# Patient Record
Sex: FEMALE | URBAN - METROPOLITAN AREA
[De-identification: names, ages, dates, MRNs, and addresses within clinical notes are randomized per-mention and may not be internally consistent; named-entity substitution may affect disease eponyms.]

---

## 2022-01-13 ENCOUNTER — OFFICE VISIT (OUTPATIENT)
Dept: URBAN - METROPOLITAN AREA TELEHEALTH 2 | Facility: TELEHEALTH | Age: 57
End: 2022-01-13
Payer: COMMERCIAL

## 2022-01-13 ENCOUNTER — DASHBOARD ENCOUNTERS (OUTPATIENT)
Age: 57
End: 2022-01-13

## 2022-01-13 ENCOUNTER — TELEPHONE ENCOUNTER (OUTPATIENT)
Dept: URBAN - METROPOLITAN AREA CLINIC 92 | Facility: CLINIC | Age: 57
End: 2022-01-13

## 2022-01-13 ENCOUNTER — TELEPHONE ENCOUNTER (OUTPATIENT)
Dept: URBAN - METROPOLITAN AREA CLINIC 82 | Facility: CLINIC | Age: 57
End: 2022-01-13

## 2022-01-13 DIAGNOSIS — R19.5 POSITIVE COLORECTAL CANCER SCREENING USING COLOGUARD TEST: ICD-10-CM

## 2022-01-13 PROBLEM — 398032003: Status: ACTIVE | Noted: 2022-01-13

## 2022-01-13 PROCEDURE — 99243 OFF/OP CNSLTJ NEW/EST LOW 30: CPT | Performed by: INTERNAL MEDICINE

## 2022-01-13 PROCEDURE — 99203 OFFICE O/P NEW LOW 30 MIN: CPT | Performed by: INTERNAL MEDICINE

## 2022-01-13 RX ORDER — POLYETHYLENE GLYCOL 3350 17 G/17G
AS DIRECTED PRIOR TO COLONOSCOPY POWDER, FOR SOLUTION ORAL ONCE
Qty: 238 GRAM | Refills: 0 | OUTPATIENT
Start: 2022-01-13 | End: 2022-01-14

## 2022-01-13 RX ORDER — METOCLOPRAMIDE HYDROCHLORIDE 10 MG/1
1 TABLET BEFORE MEALS TABLET ORAL TWICE A DAY
Qty: 14 TABLET | Refills: 0 | OUTPATIENT
Start: 2022-01-13

## 2022-01-13 NOTE — HPI-TODAY'S VISIT:
56-year-old female was referred to our office by Dr. Ismael Valdovinos for evaluation of Cologuard test.  Copy of this note will be sent to Dr. Valdovinos office.  Patient has  past medical history significant for history of diabetes on multiple medications was seen today for the evaluation of positive Cologuard test.  Patient stated she has had some loose stools recently when she was on higher dose of metformin however since her hemoglobin A1c was higher recently that she was started on Jardiance and glipizide glimepiride which has helped her blood sugar levels and the loose stools have improved.  Patient denies any chest pain shortness of breath.  Patient denies any symptoms of unintentional weight loss.  She reports occasional loose stools in the mornings and otherwise they improve with time.  Denies any blood in the stool.  She has not had a prior colonoscopy evaluation.  She reports having history of mitral valve prolapse and occasional palpitations.

## 2022-01-14 ENCOUNTER — TELEPHONE ENCOUNTER (OUTPATIENT)
Dept: URBAN - METROPOLITAN AREA CLINIC 82 | Facility: CLINIC | Age: 57
End: 2022-01-14

## 2022-01-18 ENCOUNTER — LAB OUTSIDE AN ENCOUNTER (OUTPATIENT)
Dept: URBAN - METROPOLITAN AREA TELEHEALTH 2 | Facility: TELEHEALTH | Age: 57
End: 2022-01-18

## 2022-01-19 ENCOUNTER — WEB ENCOUNTER (OUTPATIENT)
Dept: URBAN - METROPOLITAN AREA CLINIC 82 | Facility: CLINIC | Age: 57
End: 2022-01-19

## 2022-02-15 ENCOUNTER — OFFICE VISIT (OUTPATIENT)
Dept: URBAN - METROPOLITAN AREA SURGERY CENTER 13 | Facility: SURGERY CENTER | Age: 57
End: 2022-02-15
Payer: COMMERCIAL

## 2022-02-15 ENCOUNTER — CLAIMS CREATED FROM THE CLAIM WINDOW (OUTPATIENT)
Dept: URBAN - METROPOLITAN AREA CLINIC 4 | Facility: CLINIC | Age: 57
End: 2022-02-15
Payer: COMMERCIAL

## 2022-02-15 DIAGNOSIS — D12.5 ADENOMA OF SIGMOID COLON: ICD-10-CM

## 2022-02-15 DIAGNOSIS — K62.89 ACUTE PROCTITIS: ICD-10-CM

## 2022-02-15 DIAGNOSIS — R19.5 ABNORMAL CONSISTENCY OF STOOL: ICD-10-CM

## 2022-02-15 DIAGNOSIS — D12.5 BENIGN NEOPLASM OF SIGMOID COLON: ICD-10-CM

## 2022-02-15 DIAGNOSIS — K63.89 GASEOUS DISTENTION OF INTESTINE DETERMINED BY X-RAY: ICD-10-CM

## 2022-02-15 PROCEDURE — G8907 PT DOC NO EVENTS ON DISCHARG: HCPCS | Performed by: INTERNAL MEDICINE

## 2022-02-15 PROCEDURE — 45385 COLONOSCOPY W/LESION REMOVAL: CPT | Performed by: INTERNAL MEDICINE

## 2022-02-15 PROCEDURE — 88305 TISSUE EXAM BY PATHOLOGIST: CPT | Performed by: PATHOLOGY

## 2022-03-22 ENCOUNTER — OFFICE VISIT (OUTPATIENT)
Dept: URBAN - METROPOLITAN AREA CLINIC 12 | Facility: CLINIC | Age: 57
End: 2022-03-22